# Patient Record
Sex: FEMALE | Race: WHITE | NOT HISPANIC OR LATINO | Employment: FULL TIME | URBAN - METROPOLITAN AREA
[De-identification: names, ages, dates, MRNs, and addresses within clinical notes are randomized per-mention and may not be internally consistent; named-entity substitution may affect disease eponyms.]

---

## 2017-02-14 ENCOUNTER — OFFICE VISIT (OUTPATIENT)
Dept: FAMILY MEDICINE CLINIC | Facility: CLINIC | Age: 32
End: 2017-02-14

## 2017-02-14 DIAGNOSIS — R42 DIZZINESS: ICD-10-CM

## 2017-02-14 DIAGNOSIS — G43.809 OTHER TYPE OF MIGRAINE: Primary | ICD-10-CM

## 2017-02-14 PROCEDURE — 99203 OFFICE O/P NEW LOW 30 MIN: CPT | Performed by: FAMILY MEDICINE

## 2017-02-14 RX ORDER — SUMATRIPTAN 50 MG/1
TABLET, FILM COATED ORAL
Qty: 9 TABLET | Refills: 0 | Status: SHIPPED | OUTPATIENT
Start: 2017-02-14 | End: 2017-03-13 | Stop reason: SDUPTHER

## 2017-02-14 NOTE — PROGRESS NOTES
"Subjective   Chioma Connolly is a 31 y.o. female.   Chief Complaint   Patient presents with   • Migraine   • Dizziness       History of Present Illness     This is a new patient in our office.    #1 migraine headaches-diagnosed in high school.  Patient typically gets it on one side, temple area or behind eye.  Sometimes sharp , sometimes dull.  It is associated with nausea and vomiting. It is associated with blurry vision.  Intensity usually 7-8 out of 10.  It is associated with photophobia.  Noise and smells make it worse.  Patient tried Advil migraine and it helps some.  Years ago she used Imitrex and it worked.  She gets about 2-3 migraines a month.  Usually around her periods.      #2 dizziness-started about 2 years ago.  It is present almost daily.  It is described as a sensation of being off balance.  Patient says that she feels like \"on the boat\".  She has no sensation of the room spinning around and no sensation of lightheadedness.  It is worse when she is stressed, when she rash, when she bends over.  It is better when she is outside walking.  She has nasal congestion, whole year long.  Only in the mornings.  No drainage, no sneezing.    For more history please see health history form which was scanned.    The following portions of the patient's history were reviewed and updated as appropriate: allergies, current medications, past family history, past medical history, past social history, past surgical history and problem list.    Review of Systems   Constitutional: Negative.    HENT: Positive for congestion. Negative for rhinorrhea and sneezing.    Respiratory: Negative.    Cardiovascular: Negative.    Neurological: Positive for dizziness and headaches.         Objective   Wt Readings from Last 3 Encounters:   02/14/17 113 lb (51.3 kg)      Vitals:    02/14/17 1319   BP: 104/70   Pulse: 85   Temp: 98 °F (36.7 °C)   SpO2: 98%     Temp Readings from Last 3 Encounters:   02/14/17 98 °F (36.7 °C)     BP Readings " from Last 3 Encounters:   02/14/17 104/70     Pulse Readings from Last 3 Encounters:   02/14/17 85       Physical Exam   Constitutional: She is oriented to person, place, and time. She appears well-developed and well-nourished.   HENT:   Head: Normocephalic and atraumatic.   Right Ear: Tympanic membrane, external ear and ear canal normal.   Left Ear: Tympanic membrane, external ear and ear canal normal.   Nose: Nose normal. No mucosal edema or rhinorrhea.   Mouth/Throat: Oropharynx is clear and moist and mucous membranes are normal.   Eyes: EOM are normal. Pupils are equal, round, and reactive to light.   Neck: Neck supple. Carotid bruit is not present. No thyromegaly present.   Cardiovascular: Normal rate, regular rhythm and normal heart sounds.    Pulmonary/Chest: Effort normal and breath sounds normal.   Musculoskeletal:   Mm strength 5/5 BL   Neurological: She is alert and oriented to person, place, and time. She has normal reflexes.   Skin: Skin is warm, dry and intact.   Psychiatric: She has a normal mood and affect. Her behavior is normal.       Assessment/Plan   Chioma was seen today for migraine and dizziness.    Diagnoses and all orders for this visit:    Other type of migraine  -     CBC (No Diff)  -     Comprehensive Metabolic Panel  -     Lipid Panel With LDL / HDL Ratio  -     Thyroid Cascade Profile  -     Vitamin D 25 Hydroxy    Dizziness  -     CBC (No Diff)  -     Comprehensive Metabolic Panel  -     Lipid Panel With LDL / HDL Ratio  -     Thyroid Cascade Profile  -     Vitamin D 25 Hydroxy    Other orders  -     SUMAtriptan (IMITREX) 50 MG tablet; Take one tablet at onset of headache. May repeat dose one time in 2 hours if headache not relieved.        #1 migraine headaches-will start Imitrex as needed.  Follow-up in 6 months, or sooner if problems.      #2 dizziness-orthostatic pressures checked.  Blood pressure stays stable, but there was 20 points increase in pulse from sitting to standing  position.  We'll check labs.  Patient is advised to increase fluids.  She should start exercise at least 30 minutes a day, 5 days a week.

## 2017-02-16 VITALS
BODY MASS INDEX: 19.29 KG/M2 | TEMPERATURE: 98 F | HEIGHT: 64 IN | HEART RATE: 90 BPM | WEIGHT: 113 LBS | OXYGEN SATURATION: 98 % | DIASTOLIC BLOOD PRESSURE: 80 MMHG | SYSTOLIC BLOOD PRESSURE: 120 MMHG

## 2017-03-13 RX ORDER — SUMATRIPTAN 50 MG/1
TABLET, FILM COATED ORAL
Qty: 9 TABLET | Refills: 0 | Status: SHIPPED | OUTPATIENT
Start: 2017-03-13 | End: 2017-06-09 | Stop reason: SDUPTHER

## 2017-03-14 DIAGNOSIS — E80.6 HYPERBILIRUBINEMIA: Primary | ICD-10-CM

## 2017-04-19 ENCOUNTER — RESULTS ENCOUNTER (OUTPATIENT)
Dept: FAMILY MEDICINE CLINIC | Facility: CLINIC | Age: 32
End: 2017-04-19

## 2017-04-19 DIAGNOSIS — E80.6 HYPERBILIRUBINEMIA: ICD-10-CM

## 2017-06-09 RX ORDER — SUMATRIPTAN 50 MG/1
TABLET, FILM COATED ORAL
Qty: 9 TABLET | Refills: 2 | Status: SHIPPED | OUTPATIENT
Start: 2017-06-09 | End: 2017-08-15 | Stop reason: SDUPTHER

## 2017-08-15 ENCOUNTER — OFFICE VISIT (OUTPATIENT)
Dept: FAMILY MEDICINE CLINIC | Facility: CLINIC | Age: 32
End: 2017-08-15

## 2017-08-15 VITALS
BODY MASS INDEX: 20.14 KG/M2 | OXYGEN SATURATION: 98 % | WEIGHT: 118 LBS | SYSTOLIC BLOOD PRESSURE: 120 MMHG | DIASTOLIC BLOOD PRESSURE: 70 MMHG | TEMPERATURE: 98.5 F | HEIGHT: 64 IN | HEART RATE: 76 BPM

## 2017-08-15 DIAGNOSIS — G43.829 MENSTRUAL MIGRAINE WITHOUT STATUS MIGRAINOSUS, NOT INTRACTABLE: Primary | ICD-10-CM

## 2017-08-15 PROCEDURE — 99213 OFFICE O/P EST LOW 20 MIN: CPT | Performed by: FAMILY MEDICINE

## 2017-08-15 RX ORDER — SUMATRIPTAN 50 MG/1
TABLET, FILM COATED ORAL
Qty: 9 TABLET | Refills: 5 | Status: SHIPPED | OUTPATIENT
Start: 2017-08-15 | End: 2018-02-28 | Stop reason: SDUPTHER

## 2017-08-15 NOTE — PROGRESS NOTES
Subjective   Chioma Connolly is a 31 y.o. female.   Chief Complaint   Patient presents with   • Migraine       History of Present Illness     #1 migraine headaches-diagnosed in high school.  Patient typically gets it on one side, temple area or behind eye.  Sometimes sharp , sometimes dull.  It is associated with nausea and vomiting. It is associated with blurry vision.  Intensity usually 7-8 out of 10.  It is associated with photophobia.  Noise and smells make it worse.  Patient tried Advil migraine and it helped some.    She gets about 3-4 migraines a month. There is no change in character or intensity of pain.  She uses Imitrex as needed and it usually helps.    She is going to start treatment for infertility.    The following portions of the patient's history were reviewed and updated as appropriate: allergies, current medications, past family history, past medical history, past social history, past surgical history and problem list.    Review of Systems   Constitutional: Negative.    Respiratory: Negative.    Cardiovascular: Negative.          Objective   Wt Readings from Last 3 Encounters:   08/15/17 118 lb (53.5 kg)   02/14/17 113 lb (51.3 kg)      Vitals:    08/15/17 1521   BP: 120/70   Pulse: 76   Temp: 98.5 °F (36.9 °C)   SpO2: 98%     Temp Readings from Last 3 Encounters:   08/15/17 98.5 °F (36.9 °C)   02/14/17 98 °F (36.7 °C)     BP Readings from Last 3 Encounters:   08/15/17 120/70   02/14/17 120/80     Pulse Readings from Last 3 Encounters:   08/15/17 76   02/14/17 90       Physical Exam   Constitutional: She is oriented to person, place, and time. She appears well-developed and well-nourished.   HENT:   Head: Normocephalic and atraumatic.   Neck: Neck supple. Carotid bruit is not present. No thyromegaly present.   Cardiovascular: Normal rate, regular rhythm and normal heart sounds.    Pulmonary/Chest: Effort normal and breath sounds normal.   Neurological: She is alert and oriented to person, place, and  time.   Skin: Skin is warm, dry and intact.   Psychiatric: She has a normal mood and affect. Her behavior is normal.       Assessment/Plan   Chioma was seen today for migraine.    Diagnoses and all orders for this visit:    Menstrual migraine without status migrainosus, not intractable    Other orders  -     SUMAtriptan (IMITREX) 50 MG tablet; Take one tablet at onset of headache. May repeat dose one time in 2 hours if headache not relieved.        #1 migraine headaches-will continue current treatment.  Follow-up in 6 months.  Patient is aware that she cannot use Imitrex when she is pregnant.  Follow-up in 6 months.

## 2018-02-28 ENCOUNTER — OFFICE VISIT (OUTPATIENT)
Dept: INTERNAL MEDICINE | Facility: CLINIC | Age: 33
End: 2018-02-28

## 2018-02-28 VITALS
SYSTOLIC BLOOD PRESSURE: 120 MMHG | TEMPERATURE: 99 F | DIASTOLIC BLOOD PRESSURE: 70 MMHG | OXYGEN SATURATION: 98 % | HEART RATE: 75 BPM | WEIGHT: 120 LBS | BODY MASS INDEX: 20.49 KG/M2 | HEIGHT: 64 IN

## 2018-02-28 DIAGNOSIS — G43.709 CHRONIC MIGRAINE WITHOUT AURA WITHOUT STATUS MIGRAINOSUS, NOT INTRACTABLE: Primary | ICD-10-CM

## 2018-02-28 PROCEDURE — 99213 OFFICE O/P EST LOW 20 MIN: CPT | Performed by: FAMILY MEDICINE

## 2018-02-28 RX ORDER — SUMATRIPTAN 50 MG/1
TABLET, FILM COATED ORAL
Qty: 9 TABLET | Refills: 5 | Status: SHIPPED | OUTPATIENT
Start: 2018-02-28 | End: 2019-04-25 | Stop reason: SDUPTHER

## 2018-02-28 NOTE — PROGRESS NOTES
Subjective   Chioma Connolly is a 32 y.o. female.   Chief Complaint   Patient presents with   • Migraine       History of Present Illness     #1 Migraine headaches-patient reports no change in character or intensity of pain.  She gets on average 4 migraines a month.  No change in frequency.  Headaches respond to Imitrex.  Usually they respond to one tablet, sometimes she has to take a second dose.    She was evaluated by infertility clinic.  She is not on treatment.  She is aware that she cannot take Imitrex while pregnant.    The following portions of the patient's history were reviewed and updated as appropriate: allergies, current medications, past family history, past medical history, past social history, past surgical history and problem list.    Review of Systems   Constitutional: Negative.    HENT: Positive for congestion.    Respiratory: Negative.    Cardiovascular: Negative.          Objective   Wt Readings from Last 3 Encounters:   02/28/18 54.4 kg (120 lb)   08/15/17 53.5 kg (118 lb)   02/14/17 51.3 kg (113 lb)      Vitals:    02/28/18 1533   BP: 120/70   Pulse: 75   Temp: 99 °F (37.2 °C)   SpO2: 98%     Temp Readings from Last 3 Encounters:   02/28/18 99 °F (37.2 °C)   08/15/17 98.5 °F (36.9 °C)   02/14/17 98 °F (36.7 °C)     BP Readings from Last 3 Encounters:   02/28/18 120/70   08/15/17 120/70   02/14/17 120/80     Pulse Readings from Last 3 Encounters:   02/28/18 75   08/15/17 76   02/14/17 90       Physical Exam   Constitutional: She is oriented to person, place, and time. She appears well-developed and well-nourished.   HENT:   Head: Normocephalic and atraumatic.   Eyes: EOM are normal. Pupils are equal, round, and reactive to light.   Neck: Neck supple. Carotid bruit is not present. No thyromegaly present.   Cardiovascular: Normal rate, regular rhythm and normal heart sounds.    Pulmonary/Chest: Effort normal and breath sounds normal.   Neurological: She is alert and oriented to person, place, and  time.   Skin: Skin is warm, dry and intact.   Psychiatric: She has a normal mood and affect. Her behavior is normal.       Assessment/Plan   Chioma was seen today for migraine.    Diagnoses and all orders for this visit:    Chronic migraine without aura without status migrainosus, not intractable    Other orders  -     SUMAtriptan (IMITREX) 50 MG tablet; Take one tablet at onset of headache. May repeat dose one time in 2 hours if headache not relieved.        #1 migraine headaches-controlled.  Continue same.  Follow-up in 6 months.  Patient is aware that she cannot use Imitrex while pregnant.

## 2019-04-26 RX ORDER — SUMATRIPTAN 50 MG/1
TABLET, FILM COATED ORAL
Qty: 9 TABLET | Refills: 4 | Status: SHIPPED | OUTPATIENT
Start: 2019-04-26 | End: 2019-05-01 | Stop reason: SDUPTHER

## 2019-05-01 ENCOUNTER — OFFICE VISIT (OUTPATIENT)
Dept: INTERNAL MEDICINE | Facility: CLINIC | Age: 34
End: 2019-05-01

## 2019-05-01 VITALS
TEMPERATURE: 98 F | HEIGHT: 63 IN | SYSTOLIC BLOOD PRESSURE: 100 MMHG | HEART RATE: 98 BPM | DIASTOLIC BLOOD PRESSURE: 70 MMHG | WEIGHT: 122 LBS | OXYGEN SATURATION: 94 % | BODY MASS INDEX: 21.62 KG/M2

## 2019-05-01 DIAGNOSIS — G43.009 MIGRAINE WITHOUT AURA AND WITHOUT STATUS MIGRAINOSUS, NOT INTRACTABLE: Primary | ICD-10-CM

## 2019-05-01 PROCEDURE — 99213 OFFICE O/P EST LOW 20 MIN: CPT | Performed by: FAMILY MEDICINE

## 2019-05-01 RX ORDER — SUMATRIPTAN 50 MG/1
TABLET, FILM COATED ORAL
Qty: 9 TABLET | Refills: 5 | Status: SHIPPED | OUTPATIENT
Start: 2019-05-01 | End: 2020-03-20 | Stop reason: SDUPTHER

## 2019-05-01 NOTE — PROGRESS NOTES
Subjective   Chioma Connolly is a 33 y.o. female.   Chief Complaint   Patient presents with   • Migraine       History of Present Illness     Patient was seen in our office a year ago.  No change in medical or surgical history.  No change in family history.  She is on no new prescription medications.  She started taking CBD oil which helpes her to relax.  She does not smoke cigarettes.  She had a Pap smear done at fertility clinic less than a year ago.    #1  Migraine headaches-diagnosed years ago.  Patient is on Imitrex at 50 mg taken as needed.  She gets migraines on average 4 times a month.  Respond to Imitrex most of the time.  Occasionally she gets headaches that do not respond to Imitrex.  Patient has no side effects from Imitrex.  There is no change in character or intensity of pain.    The following portions of the patient's history were reviewed and updated as appropriate: allergies, current medications, past family history, past medical history, past social history, past surgical history and problem list.    Review of Systems   Constitutional: Negative.    Respiratory: Negative.    Cardiovascular: Negative.    Psychiatric/Behavioral: Negative.          Objective   Wt Readings from Last 3 Encounters:   05/01/19 55.3 kg (122 lb)   02/28/18 54.4 kg (120 lb)   08/15/17 53.5 kg (118 lb)      Vitals:    05/01/19 1512   BP: 100/70   Pulse: 98   Temp: 98 °F (36.7 °C)   SpO2: 94%     Temp Readings from Last 3 Encounters:   05/01/19 98 °F (36.7 °C)   02/28/18 99 °F (37.2 °C)   08/15/17 98.5 °F (36.9 °C)     BP Readings from Last 3 Encounters:   05/01/19 100/70   02/28/18 120/70   08/15/17 120/70     Pulse Readings from Last 3 Encounters:   05/01/19 98   02/28/18 75   08/15/17 76       Physical Exam   Constitutional: She is oriented to person, place, and time. She appears well-developed and well-nourished.   HENT:   Head: Normocephalic and atraumatic.   Neck: Neck supple. Carotid bruit is not present. No thyromegaly  present.   Cardiovascular: Normal rate, regular rhythm and normal heart sounds.   Pulmonary/Chest: Effort normal and breath sounds normal.   Neurological: She is alert and oriented to person, place, and time.   Skin: Skin is warm, dry and intact.   Psychiatric: She has a normal mood and affect. Her behavior is normal.       Assessment/Plan   There are no diagnoses linked to this encounter.    #1Migraine  headaches- we will continue current treatment. When she does not respond to Imitrex she can take second dose after 2 hours.  She can also try to take it with Tylenol or Aleve.  Follow-up in 6 to 12 months.

## 2020-03-20 ENCOUNTER — OFFICE VISIT (OUTPATIENT)
Dept: INTERNAL MEDICINE | Facility: CLINIC | Age: 35
End: 2020-03-20

## 2020-03-20 VITALS
HEIGHT: 63 IN | OXYGEN SATURATION: 99 % | TEMPERATURE: 98 F | HEART RATE: 71 BPM | SYSTOLIC BLOOD PRESSURE: 120 MMHG | DIASTOLIC BLOOD PRESSURE: 68 MMHG | WEIGHT: 127 LBS | BODY MASS INDEX: 22.5 KG/M2

## 2020-03-20 DIAGNOSIS — G43.009 MIGRAINE WITHOUT AURA AND WITHOUT STATUS MIGRAINOSUS, NOT INTRACTABLE: Primary | ICD-10-CM

## 2020-03-20 PROCEDURE — 99213 OFFICE O/P EST LOW 20 MIN: CPT | Performed by: FAMILY MEDICINE

## 2020-03-20 RX ORDER — SUMATRIPTAN 50 MG/1
TABLET, FILM COATED ORAL
Qty: 9 TABLET | Refills: 5 | Status: SHIPPED | OUTPATIENT
Start: 2020-03-20 | End: 2021-03-04 | Stop reason: SDUPTHER

## 2020-03-20 NOTE — PROGRESS NOTES
Subjective   Chioma Connolly is a 34 y.o. female.   Chief Complaint   Patient presents with   • Migraine     Pt here to follow up on Migraines. Pt requesting a refill on Imitrex.        History of Present Illness     #1  Migraine headaches- patient is on Imitrex 50 mg.  She gets on average 4-5 migraines a month.  Usually they respond to Imitrex.  Sometimes she has to take Tylenol or ibuprofen 2 hours later.  She never took second dose of Imitrex.  She reports no significant side effects.  There is no change in character or intensity of migraine headaches.    She does not use tobacco products.    The following portions of the patient's history were reviewed and updated as appropriate: allergies, current medications, past medical history, past social history and problem list.    Review of Systems   Constitutional: Negative.    Respiratory: Negative.    Cardiovascular: Negative.    Neurological: Negative.    Psychiatric/Behavioral: Negative.          Objective   Wt Readings from Last 3 Encounters:   03/20/20 57.6 kg (127 lb)   05/01/19 55.3 kg (122 lb)   02/28/18 54.4 kg (120 lb)      Vitals:    03/20/20 0905   BP: 120/68   Pulse: 71   Temp: 98 °F (36.7 °C)   SpO2: 99%     Temp Readings from Last 3 Encounters:   03/20/20 98 °F (36.7 °C)   05/01/19 98 °F (36.7 °C)   02/28/18 99 °F (37.2 °C)     BP Readings from Last 3 Encounters:   03/20/20 120/68   05/01/19 100/70   02/28/18 120/70     Pulse Readings from Last 3 Encounters:   03/20/20 71   05/01/19 98   02/28/18 75     Body mass index is 22.5 kg/m².    Physical Exam   Constitutional: She is oriented to person, place, and time. She appears well-developed and well-nourished.   HENT:   Head: Normocephalic and atraumatic.   Neck: Neck supple. Carotid bruit is not present. No thyromegaly present.   Cardiovascular: Normal rate, regular rhythm and normal heart sounds.   Pulmonary/Chest: Effort normal and breath sounds normal.   Neurological: She is alert and oriented to person,  place, and time.   Skin: Skin is warm, dry and intact.   Psychiatric: She has a normal mood and affect. Her behavior is normal.       Assessment/Plan   Chioma was seen today for migraine.    Diagnoses and all orders for this visit:    Migraine without aura and without status migrainosus, not intractable    Other orders  -     SUMAtriptan (IMITREX) 50 MG tablet; Take one tablet at onset of headache. May repeat dose one time in 2 hours if headache not relieved.        #1 migraine headaches- we will continue current treatment.  If headache persists after 2 hours after taking Imitrex, she can take additional half tablet or 1 tablet of Imitrex.  Follow-up in 6 months.  Patient is due for physical exam is advised to schedule it.

## 2021-01-12 RX ORDER — SUMATRIPTAN 50 MG/1
TABLET, FILM COATED ORAL
Qty: 9 TABLET | Refills: 5 | OUTPATIENT
Start: 2021-01-12

## 2021-03-04 NOTE — TELEPHONE ENCOUNTER
Caller: Chioma Connolly    Relationship: Self    Best call back number: [phone]   Medication needed:   Requested Prescriptions     Pending Prescriptions Disp Refills   • SUMAtriptan (IMITREX) 50 MG tablet 9 tablet 5     Sig: Take one tablet at onset of headache. May repeat dose one time in 2 hours if headache not relieved.       When do you need the refill by: TODAY  What details did the patient provide when requesting the medication: PATIENT IS ASKING IF SHE CAN GET ONE REFILL UNTIL HER APPT ON 03/17. SHE ONLY  HAS ONE PILL LEFT.     Does the patient have less than a 3 day supply:  [x] Yes  [] No    What is the patient's preferred pharmacy: [id number] [place] [place] [facility] [address] AT [facility] [phone] [facility] [phone] FX

## 2021-03-05 RX ORDER — SUMATRIPTAN 50 MG/1
TABLET, FILM COATED ORAL
Qty: 9 TABLET | Refills: 5 | Status: SHIPPED | OUTPATIENT
Start: 2021-03-05 | End: 2021-03-05

## 2021-03-05 RX ORDER — SUMATRIPTAN 50 MG/1
TABLET, FILM COATED ORAL
Qty: 9 TABLET | Refills: 5 | Status: SHIPPED | OUTPATIENT
Start: 2021-03-05 | End: 2021-03-17 | Stop reason: SDUPTHER

## 2021-03-17 ENCOUNTER — OFFICE VISIT (OUTPATIENT)
Dept: INTERNAL MEDICINE | Facility: CLINIC | Age: 36
End: 2021-03-17

## 2021-03-17 VITALS
HEART RATE: 89 BPM | SYSTOLIC BLOOD PRESSURE: 118 MMHG | WEIGHT: 128 LBS | OXYGEN SATURATION: 99 % | HEIGHT: 63 IN | BODY MASS INDEX: 22.68 KG/M2 | TEMPERATURE: 98 F | DIASTOLIC BLOOD PRESSURE: 70 MMHG

## 2021-03-17 DIAGNOSIS — G43.009 MIGRAINE WITHOUT AURA AND WITHOUT STATUS MIGRAINOSUS, NOT INTRACTABLE: Primary | ICD-10-CM

## 2021-03-17 PROCEDURE — 99213 OFFICE O/P EST LOW 20 MIN: CPT | Performed by: FAMILY MEDICINE

## 2021-03-17 RX ORDER — SUMATRIPTAN 100 MG/1
TABLET, FILM COATED ORAL
Qty: 9 TABLET | Refills: 5 | Status: SHIPPED | OUTPATIENT
Start: 2021-03-17

## 2021-03-17 NOTE — PROGRESS NOTES
Subjective   Chioma Connolly is a 35 y.o. female.   Chief Complaint   Patient presents with   • Migraine       History of Present Illness     Last time patient was seen in our office a year ago.  She reports no change in medical, surgical or family history from last office visit.  No change in prescription medications.  Over-the-counter she takes vitamin C, vitamin B complex and multivitamins.  She is not allergic to medications.  She does not use tobacco products.    #1  Migraine headaches- patient is on Imitrex 50 mg.  She gets on average 4-5 migraines a month.  There is no change in character of migraines.  She had a few migraines that were more intense. Usually they responded to Imitrex.  Sometimes she had to take a second dose of Imitrex. Less than 50% of times.  Sometimes even the second dose did not take care of the migraine.  She is interested in trying higher dose or a different medications to see if she get better results.  She reports no significant side effects on Imitrex.      She does not use tobacco products.    The following portions of the patient's history were reviewed and updated as appropriate: allergies, current medications, past family history, past medical history, past social history, past surgical history and problem list.    Review of Systems   Constitutional: Negative.    Respiratory: Negative.    Cardiovascular: Negative.    Neurological: Negative for weakness and numbness.   Psychiatric/Behavioral: Negative.          Objective   Wt Readings from Last 3 Encounters:   03/17/21 58.1 kg (128 lb)   03/20/20 57.6 kg (127 lb)   05/01/19 55.3 kg (122 lb)      Vitals:    03/17/21 1501   BP: 118/70   Pulse: 89   Temp: 98 °F (36.7 °C)   SpO2: 99%     Temp Readings from Last 3 Encounters:   03/17/21 98 °F (36.7 °C)   03/20/20 98 °F (36.7 °C)   05/01/19 98 °F (36.7 °C)     BP Readings from Last 3 Encounters:   03/17/21 118/70   03/20/20 120/68   05/01/19 100/70     Pulse Readings from Last 3  Encounters:   03/17/21 89   03/20/20 71   05/01/19 98     Body mass index is 22.68 kg/m².    Physical Exam  Constitutional:       Appearance: She is well-developed.   HENT:      Head: Normocephalic and atraumatic.   Eyes:      Extraocular Movements: Extraocular movements intact.      Pupils: Pupils are equal, round, and reactive to light.   Neck:      Thyroid: No thyromegaly.      Vascular: No carotid bruit.   Cardiovascular:      Rate and Rhythm: Normal rate and regular rhythm.      Heart sounds: Normal heart sounds.   Pulmonary:      Effort: Pulmonary effort is normal.      Breath sounds: Normal breath sounds.   Musculoskeletal:      Cervical back: Neck supple.      Comments: Muscle strength 5/5 x4.  DTR 2+ BL x4.   Skin:     General: Skin is warm and dry.   Neurological:      Mental Status: She is alert and oriented to person, place, and time.   Psychiatric:         Behavior: Behavior normal.         Assessment/Plan   Diagnoses and all orders for this visit:    1. Migraine without aura and without status migrainosus, not intractable (Primary)    Other orders  -     SUMAtriptan (IMITREX) 100 MG tablet; Take one tablet at onset of headache. May repeat dose one time in 2 hours if headache not relieved.  Dispense: 9 tablet; Refill: 5        #1 migraine headaches-we will increase dose of Imitrex to 100 mg.  She will take it with the first symptoms of migraine and then she can have another dose in 2 hours if symptoms are not resolved.  Follow-up in 6 months, or sooner if problems.    She is reminded to schedule physical exam.